# Patient Record
(demographics unavailable — no encounter records)

---

## 2024-11-19 NOTE — DATA REVIEWED
[FreeTextEntry1] : B/L Breast Sono - 10/31/2024: Ultrasound evaluation was performed including examination of all four quadrants of the breast(s) and the retroareolar region(s).   Left breast: In the area of palpable concern at 2:00 10 cm from nipple there is a multilobulated mass oriented parallel to the chest wall, with multiple cystic components, of heterogeneous echotexture, measuring 3 x 1.3 x 2.6 cm. Real-time imaging features are most suggestive of a cluster of multiple complicated cysts / focally prominent fibrocystic changes.   Right breast: No suspicious abnormalities were seen sonographically. A 1 cm cluster of cysts is seen in the upper inner quadrant. IMPRESSION:    Area of palpable concern in the left breast appears to correlate with focally prominent fibrocystic changes at 2:00 axis. Close sonographic follow-up is recommended in 3 months.   A 3 month follow-up targeted ultrasound of the left breast(s) is recommended. Clinical follow-up is also advised. Note that management of palpable findings should be based on clinical grounds, independent of imaging results.   A letter will be sent to the patient to return for follow up.   BI-RADS 3: PROBABLY BENIGN

## 2024-11-19 NOTE — REASON FOR VISIT
[Initial Evaluation] : an initial evaluation [FreeTextEntry1] : area of palpable concern; BIRADS-3 imaging review.

## 2024-11-19 NOTE — ASSESSMENT
[FreeTextEntry1] : Patient is a 29F with left breats mass, BIRADs.  She underwent bilateral US in 10/2024 for a left palpable mass which showed a right breast cluster of cysts, along with lwft 2N10 3cm mass, probably benign (BIRADS 3) with imaging recommended in 3 months.  We discussed breast cysts. They are not pre-malignant nor do they have malignant potential and are hormonally influenced. They may grow or shrink in size as well as resolve spontaneously and there is usually no intervention unless they are symptomatic. In several large studies, patients with breast cysts and a positive family history had a higher relative risk of breast cancer (from 1.5 to 3).  I explained to the patient the BIRADS system of grading and that her findings fall into category 3 on the left. Category 3 carries a less than 5% risk of malignancy. She can choose to follow up imaging. She is also aware that she can also choose to biopsy the lesion if she wishes to. I briefly discussed the procedure will be performed by a breast imaging specialist, and will include numbing with local anesthesia, followed by a small biopsy marker placement afterwards, which is usually not bothersome, and is not recognized by radiofrequency devices.  She understands her options and wants to proceed with a biopsy.  All questions and concerns were answered in detail.  Patient is for left breast US guided bx. She is for follow up after biopsy to discuss pathology and fuether management.   Total time spent on encounter was greater than 45 minutes , which included face to face time with the patient, performing an exam, reviewing previous medical records, reviewing current imaging/ pathology, documenting in patient record and coordinating care/imaging. Greater than 50% of the encounter was spent on counseling and coordination of her breast issue.

## 2024-11-19 NOTE — PHYSICAL EXAM
[Normocephalic] : normocephalic [EOMI] : extra ocular movement intact [No Supraclavicular Adenopathy] : no supraclavicular adenopathy [No Cervical Adenopathy] : no cervical adenopathy [Examined in the supine and seated position] : examined in the supine and seated position [No dominant masses] : no dominant masses in right breast  [No Nipple Retraction] : no left nipple retraction [No Nipple Discharge] : no left nipple discharge [No Axillary Lymphadenopathy] : no left axillary lymphadenopathy [No Rashes] : no rashes [No Ulceration] : no ulceration [de-identified] : NL respirations [de-identified] : UOQ approx 3cm mass, corresponding to finding on imaging

## 2024-11-19 NOTE — PHYSICAL EXAM
[Normocephalic] : normocephalic [EOMI] : extra ocular movement intact [No Supraclavicular Adenopathy] : no supraclavicular adenopathy [No Cervical Adenopathy] : no cervical adenopathy [Examined in the supine and seated position] : examined in the supine and seated position [No dominant masses] : no dominant masses in right breast  [No Nipple Retraction] : no left nipple retraction [No Nipple Discharge] : no left nipple discharge [No Axillary Lymphadenopathy] : no left axillary lymphadenopathy [No Rashes] : no rashes [No Ulceration] : no ulceration [de-identified] : NL respirations [de-identified] : UOQ approx 3cm mass, corresponding to finding on imaging

## 2024-11-19 NOTE — HISTORY OF PRESENT ILLNESS
[FreeTextEntry1] : SEAN PIKE is a 29-year-old female patient who presents today for initial consultation for evaluation of an area of palpable concern; BIRADS-3 imaging review.  Pt noted a palpable area in her left breast approximately one month ago - she states that it is unchanged. She also states that there is a soreness present as well.  No family hx - breast / ovarian cancer.  Her imaging is as follows: B/L Breast Sono - 10/31/2024: Left breast:  -In the area of palpable concern at 2:00 10 cm from nipple there is a multilobulated mass oriented parallel to the chest wall, with multiple cystic components, of heterogeneous echotexture, measuring 3 x 1.3 x 2.6 cm. Real-time imaging features are most suggestive of a cluster of multiple complicated cysts / focally prominent fibrocystic changes. Right breast:  -No suspicious abnormalities were seen sonographically.  -A 1 cm cluster of cysts is seen in the upper inner quadrant. IMPRESSION:  Area of palpable concern in the left breast appears to correlate with focally prominent fibrocystic changes at 2:00 axis. Close sonographic follow-up is recommended in 3 months. BI-RADS 3: PROBABLY BENIGN  She notes a left breats mass since 10/2024. She notes it may have gotten bigger. No associated symptoms.

## 2024-11-19 NOTE — PHYSICAL EXAM
[Normocephalic] : normocephalic [EOMI] : extra ocular movement intact [No Supraclavicular Adenopathy] : no supraclavicular adenopathy [No Cervical Adenopathy] : no cervical adenopathy [Examined in the supine and seated position] : examined in the supine and seated position [No dominant masses] : no dominant masses in right breast  [No Nipple Retraction] : no left nipple retraction [No Nipple Discharge] : no left nipple discharge [No Axillary Lymphadenopathy] : no left axillary lymphadenopathy [No Rashes] : no rashes [No Ulceration] : no ulceration [de-identified] : NL respirations [de-identified] : UOQ approx 3cm mass, corresponding to finding on imaging

## 2024-12-05 NOTE — DATA REVIEWED
[FreeTextEntry1] : Nov 25, 2024	10:18 AM	[phone]	 Addendum Pathology Report Received From [facility]:   The pathology report of the left breast ultrasound-guided core biopsy dated 11/25/2024 indicated that the target at 2:00, 10 cm from the nipple is MALIGNANT    The pathology report of the left axilla ultrasound-guided core biopsy dated 11/25/2024 indicated that the enlarged left axillary lymph node is MALIGNANT     Case Report Breast Biopsy Report                              Case: [id number]                                 Authorizing Provider:  Luz Blanco MD     Collected:           11/25/2024 0919             Ordering Location:     [facility]                Received:            11/25/2024 0959                                    Radiology-Regional                                                                                Radiology                                                                   Pathologist:           Tomasa Wharton MD                                                           Specimens:   A) - Breast, left, left breast 2n10                                                                B) - Axilla, left, left axilla                                                         Final Diagnosis  A. 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 See comments. - Lymphovascular invasion is present (LVI).    Comments: Sections show invasive carcinoma with basaloid features and biphasic morphology.associated with lymphocytic reaction and necrosis  Immunohistochemistry stains show that tumor is positive for Tammie-3, CK7, scattered tumor cells are positive for p63, SOX-10 and cytokeratin CAM5.2 (rare positive cells). Tumor is negative for BRST-2, AR, CK20, PAX-8, CDX-2, TTF-1 and Calpoinin.    Immmunostains for D2-40 (positive) and p63 (negative) confirm lymphovascualr invasion (LVI).   Biomarkers report will follow in an addendum.      B. Axilla, left, ultrasound (US) guided core biopsy:  - Poorly differentiated carcinoma with necrosis in a background of lymphoid tissue, consistent with metastatic carcinoma involving a lymph node. See note. - Fragment of benign skeletal muscle.    Note: Metastatic carcinoma is morphologically similar to carcinoma in specimen "A".      Case was reviewed at the intradepartmental breast pathology consensus conference.        The pathology results are concordant with the imaging findings.   The patient has been notified of these results by telephone by Dr. Blanco on 12/2/2024 at 8:15 PM. She has been advised to contact your office and to arrange for SURGICAL AND ONCOLOGIC CONSULTATION AND MANAGEMENT.   The patient would benefit from a right unilateral mammogram and pre and postcontrast bilateral breast MRI   Surgical consultation of the left breast(s) is recommended. _   Electronic Signature: I personally reviewed the images and agree with this report. Final Report: Dictated by  and Signed by Attending Luz Blanco MD 12/2/2024 8:17 PM   Addended by: Luz Blanco MD on 12/2/2024  8:17 PM Pathology Report Received From [facility]:   The pathology report of the left breast ultrasound-guided core biopsy dated 11/25/2024 indicated that the target at 2:00, 10 cm from the nipple is MALIGNANT    The pathology report of the left axilla ultrasound-guided core biopsy dated 11/25/2024 indicated that the enlarged left axillary lymph node is MALIGNANT     Case Report Breast Biopsy Report                              Case: [id number]                                 Authorizing Provider:  Luz Blanco MD     Collected:           11/25/2024 0919             Ordering Location:     [facility]                Received:            11/25/2024 1621                                    Radiology-Regional                                                                                Radiology                                                                   Pathologist:           Tomasa Wharton MD                                                           Specimens:   A) - Breast, left, left breast 2n10                                                                B) - Axilla, left, left axilla                                                         Final Diagnosis  A. 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 See comments. - Lymphovascular invasion is present (LVI).    Comments: Sections show invasive carcinoma with basaloid features and biphasic morphology.associated with lymphocytic reaction and necrosis  Immunohistochemistry stains show that tumor is positive for Tammie-3, CK7, scattered tumor cells are positive for p63, SOX-10 and cytokeratin CAM5.2 (rare positive cells). Tumor is negative for BRST-2, AR, CK20, PAX-8, CDX-2, TTF-1 and Calpoinin.    Immmunostains for D2-40 (positive) and p63 (negative) confirm lymphovascualr invasion (LVI).   Biomarkers report will follow in an addendum.      B. Axilla, left, ultrasound (US) guided core biopsy:  - Poorly differentiated carcinoma with necrosis in a background of lymphoid tissue, consistent with metastatic carcinoma involving a lymph node. See note. - Fragment of benign skeletal muscle.    Note: Metastatic carcinoma is morphologically similar to carcinoma in specimen "A".      Case was reviewed at the intradepartmental breast pathology consensus conference.        The pathology results are concordant with the imaging findings.   The patient has been notified of these results by telephone by Dr. Blanco on 12/2/2024 at 8:15 PM. She has been advised to contact your office and to arrange for SURGICAL AND ONCOLOGIC CONSULTATION AND MANAGEMENT.   The patient would benefit from a right unilateral mammogram and pre and postcontrast bilateral breast MRI   Surgical consultation of the left breast(s) is recommended. _   Electronic Signature: I personally reviewed the images and agree with this report. Final Report: Dictated by  and Signed by Attending Luz Blanco MD 12/2/2024 8:17 PM   Addended by: Luz Blanco MD on 12/2/2024  8:17 PM Pathology Report Received From [facility]:   The pathology report of the left breast ultrasound-guided core biopsy dated 11/25/2024 indicated that the target at 2:00, 10 cm from the nipple is MALIGNANT    The pathology report of the left axilla ultrasound-guided core biopsy dated 11/25/2024 indicated that the enlarged left axillary lymph node is MALIGNANT     Case Report Breast Biopsy Report                              Case: [id number]                                 Authorizing Provider:  Luz Blanco MD     Collected:           11/25/2024 0919             Ordering Location:     [facility]                Received:            11/25/2024 1623                                    Radiology-Regional                                                                                Radiology                                                                   Pathologist:           Tomasa Wharton MD                                                           Specimens:   A) - Breast, left, left breast 2n10                                                                B) - Axilla, left, left axilla                                                         Final Diagnosis  A. 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 See comments. - Lymphovascular invasion is present (LVI).    Comments: Sections show invasive carcinoma with basaloid features and biphasic morphology.associated with lymphocytic reaction and necrosis  Immunohistochemistry stains show that tumor is positive for Tammie-3, CK7, scattered tumor cells are positive for p63, SOX-10 and cytokeratin CAM5.2 (rare positive cells). Tumor is negative for BRST-2, AR, CK20, PAX-8, CDX-2, TTF-1 and Calpoinin.    Immmunostains for D2-40 (positive) and p63 (negative) confirm lymphovascualr invasion (LVI).   Biomarkers report will follow in an addendum.      B. Axilla, left, ultrasound (US) guided core biopsy:  - Poorly differentiated carcinoma with necrosis in a background of lymphoid tissue, consistent with metastatic carcinoma involving a lymph node. See note. - Fragment of benign skeletal muscle.    Note: Metastatic carcinoma is morphologically similar to carcinoma in specimen "A".      Case was reviewed at the intradepartmental breast pathology consensus conference.        The pathology results are concordant with the imaging findings.   The patient has been notified of these results by telephone by Dr. Blanco on 12/2/2024 at 8:15 PM. She has been advised to contact your office and to arrange for SURGICAL AND ONCOLOGIC CONSULTATION AND MANAGEMENT.   The patient would benefit from a right unilateral mammogram and pre and postcontrast bilateral breast MRI   Surgical consultation of the left breast(s) is recommended. _   Electronic Signature: I personally reviewed the images and agree with this report. Final Report: Dictated by  and Signed by Attending Luz Blanco MD 12/2/2024 8:17 PM   Addended by: Luz Blanco MD on 12/2/2024  8:17 PM     Full Report IMPRESSION:    Ultrasound guided core needle biopsy of the left breast and left axilla. Pathology is pending.   A final report will be issued when Pathology results are available. _     US BREAST CORE BIOPSY LEFT, MAMMO TOMOSYNTHESIS DIGITAL POST PROCEDURE LEFT, US BREAST CORE BIOPSY   HISTORY: Ultrasound of the left breast dated 10/31/2024 revealed a 3 x 2.6 cm complex lobular mass at 2 o'clock, 10 cm from the nipple. Ultrasound-guided core biopsy was performed.   Ultrasound of the left axilla on 11/25/2024, revealed a 3.3 x 2 cm mass or abnormal lymph node. Ultrasound-guided core biopsy was performed   Benefits, risks and alternatives to the procedure were explained to the patient and informed written consent was obtained.    The patient denied allergies to topical antiseptic solutions or local anesthetic. Anticoagulant usage was reviewed prior to this procedure as per [facility] protocol.   Utilizing universal protocol, site and patient verification was performed prior to starting the procedure.   PROCEDURE: After sterile skin preparation, local anesthesia was achieved with 1% lidocaine. Under ultrasound guidance, a 12G vacuum assisted needle biopsy device was used to obtain multiple core specimens.    After the procedure:   An hourglass and top hat marking clips was deployed in the area of sampling of the 10:00 left breast.    A cork clip was deployed into the left axilla   The patient tolerated the procedure well without immediate complications.    POST PROCEDURE MAMMOGRAM FOR MARKER PLACEMENT   A post-procedure mammogram was performed and demonstrates a clips in the appropriate location.    Right Uni Dx Mammo - 12/03/2024: Tomosynthesis 3D and 2D imaging of the breast(s) were performed.  Current study was also evaluated with a computer aided detection (CAD) system.   Breast composition: The breast(s) is/are heterogeneously dense, which may obscure small masses.   No significant masses, calcifications, or other findings are seen in the right breast. IMPRESSION:    No imaging evidence of malignancy on the current exam(s) in the right breast.    Given the patient's age, breast density and recent diagnosis of metastatic LEFT breast cancer, pre and post contrast bilateral breast MRI is recommended and the patient was so advised   An MRI exam of both breast(s) is recommended.   _   BI-RADS 6: KNOWN BIOPSY - PROVEN MALIGNANCY (left breast)   B/L Breast MRI - 12/04/2024: Amount of fibroglandular tissue: Extreme fibroglandular tissue.   Background parenchymal enhancement: Moderate symmetric.   Findings: There are small cysts in the upper inner quadrant right breast   There is mild edema of the left breast   RIGHT: No suspicious enhancing masses or areas of ductal enhancement are seen in the right breast.     LEFT: In the 2:00 left breast at approximately 10 cm from the nipple there is a robustly enhancing lobular mass measuring 3.8 x 2.9 x 4.9 cm. In AP, transverse and craniocaudal dimensions respectively (series 8 images #54 through #95). This represents the patient's biopsy-proven invasive ductal carcinoma.. The mass abuts the chest wall but there is no chest wall invasion or abnormal enhancement   There is a 0.6 cm enhancing nodule superficially in the 5:00 left breast, approximately 5 cm from the nipple  (series 8, image #136). Further assessment with MRI guided core biopsy can be obtained, unless left mastectomy is planned. IMPRESSION:    3.8 x 2.9 x 4.9 cm lobular mass in the 2:00 left breast at 10 cm from the nipple, biopsy-proven invasive ductal carcinoma. There is a nearby enlarged abnormal left axillary lymph node measuring 3.6 x 2.4 x 4 cm, biopsy-proven metastatic disease.   0.6 cm superficial enhancing nodule in the 5:00 left breast (series 8, image #136) for which MRI guided core biopsy can be performed, unless left mastectomy is planned.  The findings have been discussed with the patient by the dictating radiologist on 12/4/2024 at 3:00 PM   Biopsy of the left breast(s) is recommended.    BI-RADS 6: KNOWN BIOPSY - PROVEN MALIGNANCY   CT Scan Chest/Abd/Pel - 12/04/2024: FINDINGS:    LUNGS/AIRWAYS: Punctate nodule along the left fissure on series 6 image 202, possible small intrapulmonary lymph node. No suspicious intrapulmonary nodules   PLEURA/PERICARDIUM: No effusion.   MEDIASTINUM/ARLETTE/LYMPH NODES: Left central axillary enlarged node with marker, probably metastasis, series 4 image 57, 3.8 x 2.6 cm. No other suspicious adenopathy.   LIVER/BILIARY: Subcapsular 0.8 enhancing focus in hepatic segment 5/6, probably perfusional or benign lesion such as hemangioma.   PANCREAS: Normal.   SPLEEN: Normal.   ADRENALS: Normal.   KIDNEYS:  Symmetric enhancement. No hydronephrosis, renal stone, or soft tissue attenuation mass.   ABDOMINOPELVIC NODES: No enlarged nodes.   BOWEL/PERITONEUM: No bowel obstruction. No ascites.   PELVIC VISCERA: Slightly prominent right adnexa with probable corpus luteum.   BONES/SOFT TISSUES:  Left breast cancer with marked 4.1 x 2.8 cm, better evaluated on same-day MRI. No suspicious bone lesion.   OTHER: None. IMPRESSION:  1. Biopsy-proven right breast cancer, better evaluated by same day MRI, and left axillary lymph node metastasis. 2. No evidence of distant metastatic disease. 3. A right hepatic subcentimeter enhancing focus probably perfusion related or benign lesion; attention on follow-up.

## 2024-12-05 NOTE — HISTORY OF PRESENT ILLNESS
[FreeTextEntry1] : SEAN PIKE is a 29-year-old female with new dx of LEFT breast IDC   Pt noted a palpable area in her left breast approximately one month ago - she states that it is unchanged. She also states that there is a soreness present as well.  No family hx - breast / ovarian cancer.  Her imaging is as follows: B/L Breast Sono - 10/31/2024: Left breast:  -In the area of palpable concern at 2:00 10 cm from nipple there is a multilobulated mass oriented parallel to the chest wall, with multiple cystic components, of heterogeneous echotexture, measuring 3 x 1.3 x 2.6 cm. Real-time imaging features are most suggestive of a cluster of multiple complicated cysts / focally prominent fibrocystic changes. Right breast:  -No suspicious abnormalities were seen sonographically.  -A 1 cm cluster of cysts is seen in the upper inner quadrant. IMPRESSION:  Area of palpable concern in the left breast appears to correlate with focally prominent fibrocystic changes at 2:00 axis. Close sonographic follow-up is recommended in 3 months. BI-RADS 3  She notes a left breast mass since 10/2024. She notes it may have gotten bigger. No associated symptoms.  11/25/2024  A. 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 See comments. - Lymphovascular invasion is present (LVI).   B. Axilla, left, ultrasound (US) guided core biopsy: cork - Poorly differentiated carcinoma with necrosis in a background of lymphoid tissue, consistent with metastatic carcinoma involving a lymph node. See note. - Fragment of benign skeletal muscle.   The pathology results are concordant with the imaging findings.  Right Uni Dx Mammo - 12/03/2024: -Breast composition: The breast(s) is/are heterogeneously dense, which may obscure small masses. -No significant masses, calcifications, or other findings are seen in the right breast. IMPRESSION: No imaging evidence of malignancy on the current exam(s) in the right breast.  An MRI exam of both breast(s) is recommended. BI-RADS 6: KNOWN BIOPSY - PROVEN MALIGNANCY (left breast)   B/L Breast MRI - 12/04/2024: -Amount of fibroglandular tissue: Extreme fibroglandular tissue. Findings:  -There are small cysts in the upper inner quadrant right breast -There is mild edema of the left breast RIGHT: -No suspicious enhancing masses or areas of ductal enhancement are seen in the right breast. LEFT: -In the 2:00 left breast at approximately 10 cm from the nipple there is a robustly enhancing lobular mass measuring 3.8 x 2.9 x 4.9 cm.  -In AP, transverse and craniocaudal dimensions respectively (series 8 images #54 through #95). This represents the patient's biopsy-proven invasive ductal carcinoma.. The mass abuts the chest wall but there is no chest wall invasion or abnormal enhancement -There is a 0.6 cm enhancing nodule superficially in the 5:00 left breast, approximately 5 cm from the nipple  (series 8, image #136). Further assessment with MRI guided core biopsy can be obtained, unless left mastectomy is planned. IMPRESSION:  -3.8 x 2.9 x 4.9 cm lobular mass in the 2:00 left breast at 10 cm from the nipple, biopsy-proven invasive ductal carcinoma.  -There is a nearby enlarged abnormal left axillary lymph node measuring 3.6 x 2.4 x 4 cm, biopsy-proven metastatic disease. -0.6 cm superficial enhancing nodule in the 5:00 left breast (series 8, image #136) for which MRI guided core biopsy can be performed, unless left mastectomy is planned.  BI-RADS 6: KNOWN BIOPSY - PROVEN MALIGNANCY   CT Scan Chest/Abd/Pel - 12/04/2024: IMPRESSION:  1. Biopsy-proven right breast cancer, better evaluated by same day MRI, and left axillary lymph node metastasis. 2. No evidence of distant metastatic disease. 3. A right hepatic subcentimeter enhancing focus probably perfusion related or benign lesion; attention on follow-up.

## 2024-12-05 NOTE — DATA REVIEWED
[FreeTextEntry1] : Nov 25, 2024	10:18 AM	[phone]	 Addendum Pathology Report Received From [facility]:   The pathology report of the left breast ultrasound-guided core biopsy dated 11/25/2024 indicated that the target at 2:00, 10 cm from the nipple is MALIGNANT    The pathology report of the left axilla ultrasound-guided core biopsy dated 11/25/2024 indicated that the enlarged left axillary lymph node is MALIGNANT     Case Report Breast Biopsy Report                              Case: [id number]                                 Authorizing Provider:  Luz Blanco MD     Collected:           11/25/2024 0919             Ordering Location:     [facility]                Received:            11/25/2024 8894                                    Radiology-Regional                                                                                Radiology                                                                   Pathologist:           Tomasa Wharton MD                                                           Specimens:   A) - Breast, left, left breast 2n10                                                                B) - Axilla, left, left axilla                                                         Final Diagnosis  A. 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 See comments. - Lymphovascular invasion is present (LVI).    Comments: Sections show invasive carcinoma with basaloid features and biphasic morphology.associated with lymphocytic reaction and necrosis  Immunohistochemistry stains show that tumor is positive for Tammie-3, CK7, scattered tumor cells are positive for p63, SOX-10 and cytokeratin CAM5.2 (rare positive cells). Tumor is negative for BRST-2, AR, CK20, PAX-8, CDX-2, TTF-1 and Calpoinin.    Immmunostains for D2-40 (positive) and p63 (negative) confirm lymphovascualr invasion (LVI).   Biomarkers report will follow in an addendum.      B. Axilla, left, ultrasound (US) guided core biopsy:  - Poorly differentiated carcinoma with necrosis in a background of lymphoid tissue, consistent with metastatic carcinoma involving a lymph node. See note. - Fragment of benign skeletal muscle.    Note: Metastatic carcinoma is morphologically similar to carcinoma in specimen "A".      Case was reviewed at the intradepartmental breast pathology consensus conference.        The pathology results are concordant with the imaging findings.   The patient has been notified of these results by telephone by Dr. Blanco on 12/2/2024 at 8:15 PM. She has been advised to contact your office and to arrange for SURGICAL AND ONCOLOGIC CONSULTATION AND MANAGEMENT.   The patient would benefit from a right unilateral mammogram and pre and postcontrast bilateral breast MRI   Surgical consultation of the left breast(s) is recommended. _   Electronic Signature: I personally reviewed the images and agree with this report. Final Report: Dictated by  and Signed by Attending Luz Blanco MD 12/2/2024 8:17 PM   Addended by: Luz Blanco MD on 12/2/2024  8:17 PM Pathology Report Received From [facility]:   The pathology report of the left breast ultrasound-guided core biopsy dated 11/25/2024 indicated that the target at 2:00, 10 cm from the nipple is MALIGNANT    The pathology report of the left axilla ultrasound-guided core biopsy dated 11/25/2024 indicated that the enlarged left axillary lymph node is MALIGNANT     Case Report Breast Biopsy Report                              Case: [id number]                                 Authorizing Provider:  Luz Blanco MD     Collected:           11/25/2024 0919             Ordering Location:     [facility]                Received:            11/25/2024 1628                                    Radiology-Regional                                                                                Radiology                                                                   Pathologist:           Tomasa Wharton MD                                                           Specimens:   A) - Breast, left, left breast 2n10                                                                B) - Axilla, left, left axilla                                                         Final Diagnosis  A. 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 See comments. - Lymphovascular invasion is present (LVI).    Comments: Sections show invasive carcinoma with basaloid features and biphasic morphology.associated with lymphocytic reaction and necrosis  Immunohistochemistry stains show that tumor is positive for Tammie-3, CK7, scattered tumor cells are positive for p63, SOX-10 and cytokeratin CAM5.2 (rare positive cells). Tumor is negative for BRST-2, AR, CK20, PAX-8, CDX-2, TTF-1 and Calpoinin.    Immmunostains for D2-40 (positive) and p63 (negative) confirm lymphovascualr invasion (LVI).   Biomarkers report will follow in an addendum.      B. Axilla, left, ultrasound (US) guided core biopsy:  - Poorly differentiated carcinoma with necrosis in a background of lymphoid tissue, consistent with metastatic carcinoma involving a lymph node. See note. - Fragment of benign skeletal muscle.    Note: Metastatic carcinoma is morphologically similar to carcinoma in specimen "A".      Case was reviewed at the intradepartmental breast pathology consensus conference.        The pathology results are concordant with the imaging findings.   The patient has been notified of these results by telephone by Dr. Blanco on 12/2/2024 at 8:15 PM. She has been advised to contact your office and to arrange for SURGICAL AND ONCOLOGIC CONSULTATION AND MANAGEMENT.   The patient would benefit from a right unilateral mammogram and pre and postcontrast bilateral breast MRI   Surgical consultation of the left breast(s) is recommended. _   Electronic Signature: I personally reviewed the images and agree with this report. Final Report: Dictated by  and Signed by Attending Luz Blanco MD 12/2/2024 8:17 PM   Addended by: Luz Blanco MD on 12/2/2024  8:17 PM Pathology Report Received From [facility]:   The pathology report of the left breast ultrasound-guided core biopsy dated 11/25/2024 indicated that the target at 2:00, 10 cm from the nipple is MALIGNANT    The pathology report of the left axilla ultrasound-guided core biopsy dated 11/25/2024 indicated that the enlarged left axillary lymph node is MALIGNANT     Case Report Breast Biopsy Report                              Case: [id number]                                 Authorizing Provider:  Luz Blanco MD     Collected:           11/25/2024 0919             Ordering Location:     [facility]                Received:            11/25/2024 1623                                    Radiology-Regional                                                                                Radiology                                                                   Pathologist:           Tomasa Wharton MD                                                           Specimens:   A) - Breast, left, left breast 2n10                                                                B) - Axilla, left, left axilla                                                         Final Diagnosis  A. 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 See comments. - Lymphovascular invasion is present (LVI).    Comments: Sections show invasive carcinoma with basaloid features and biphasic morphology.associated with lymphocytic reaction and necrosis  Immunohistochemistry stains show that tumor is positive for Tammie-3, CK7, scattered tumor cells are positive for p63, SOX-10 and cytokeratin CAM5.2 (rare positive cells). Tumor is negative for BRST-2, AR, CK20, PAX-8, CDX-2, TTF-1 and Calpoinin.    Immmunostains for D2-40 (positive) and p63 (negative) confirm lymphovascualr invasion (LVI).   Biomarkers report will follow in an addendum.      B. Axilla, left, ultrasound (US) guided core biopsy:  - Poorly differentiated carcinoma with necrosis in a background of lymphoid tissue, consistent with metastatic carcinoma involving a lymph node. See note. - Fragment of benign skeletal muscle.    Note: Metastatic carcinoma is morphologically similar to carcinoma in specimen "A".      Case was reviewed at the intradepartmental breast pathology consensus conference.        The pathology results are concordant with the imaging findings.   The patient has been notified of these results by telephone by Dr. Blanco on 12/2/2024 at 8:15 PM. She has been advised to contact your office and to arrange for SURGICAL AND ONCOLOGIC CONSULTATION AND MANAGEMENT.   The patient would benefit from a right unilateral mammogram and pre and postcontrast bilateral breast MRI   Surgical consultation of the left breast(s) is recommended. _   Electronic Signature: I personally reviewed the images and agree with this report. Final Report: Dictated by  and Signed by Attending Luz Blanco MD 12/2/2024 8:17 PM   Addended by: Luz Blanco MD on 12/2/2024  8:17 PM     Full Report IMPRESSION:    Ultrasound guided core needle biopsy of the left breast and left axilla. Pathology is pending.   A final report will be issued when Pathology results are available. _     US BREAST CORE BIOPSY LEFT, MAMMO TOMOSYNTHESIS DIGITAL POST PROCEDURE LEFT, US BREAST CORE BIOPSY   HISTORY: Ultrasound of the left breast dated 10/31/2024 revealed a 3 x 2.6 cm complex lobular mass at 2 o'clock, 10 cm from the nipple. Ultrasound-guided core biopsy was performed.   Ultrasound of the left axilla on 11/25/2024, revealed a 3.3 x 2 cm mass or abnormal lymph node. Ultrasound-guided core biopsy was performed   Benefits, risks and alternatives to the procedure were explained to the patient and informed written consent was obtained.    The patient denied allergies to topical antiseptic solutions or local anesthetic. Anticoagulant usage was reviewed prior to this procedure as per [facility] protocol.   Utilizing universal protocol, site and patient verification was performed prior to starting the procedure.   PROCEDURE: After sterile skin preparation, local anesthesia was achieved with 1% lidocaine. Under ultrasound guidance, a 12G vacuum assisted needle biopsy device was used to obtain multiple core specimens.    After the procedure:   An hourglass and top hat marking clips was deployed in the area of sampling of the 10:00 left breast.    A cork clip was deployed into the left axilla   The patient tolerated the procedure well without immediate complications.    POST PROCEDURE MAMMOGRAM FOR MARKER PLACEMENT   A post-procedure mammogram was performed and demonstrates a clips in the appropriate location.    Right Uni Dx Mammo - 12/03/2024: Tomosynthesis 3D and 2D imaging of the breast(s) were performed.  Current study was also evaluated with a computer aided detection (CAD) system.   Breast composition: The breast(s) is/are heterogeneously dense, which may obscure small masses.   No significant masses, calcifications, or other findings are seen in the right breast. IMPRESSION:    No imaging evidence of malignancy on the current exam(s) in the right breast.    Given the patient's age, breast density and recent diagnosis of metastatic LEFT breast cancer, pre and post contrast bilateral breast MRI is recommended and the patient was so advised   An MRI exam of both breast(s) is recommended.   _   BI-RADS 6: KNOWN BIOPSY - PROVEN MALIGNANCY (left breast)   B/L Breast MRI - 12/04/2024: Amount of fibroglandular tissue: Extreme fibroglandular tissue.   Background parenchymal enhancement: Moderate symmetric.   Findings: There are small cysts in the upper inner quadrant right breast   There is mild edema of the left breast   RIGHT: No suspicious enhancing masses or areas of ductal enhancement are seen in the right breast.     LEFT: In the 2:00 left breast at approximately 10 cm from the nipple there is a robustly enhancing lobular mass measuring 3.8 x 2.9 x 4.9 cm. In AP, transverse and craniocaudal dimensions respectively (series 8 images #54 through #95). This represents the patient's biopsy-proven invasive ductal carcinoma.. The mass abuts the chest wall but there is no chest wall invasion or abnormal enhancement   There is a 0.6 cm enhancing nodule superficially in the 5:00 left breast, approximately 5 cm from the nipple  (series 8, image #136). Further assessment with MRI guided core biopsy can be obtained, unless left mastectomy is planned. IMPRESSION:    3.8 x 2.9 x 4.9 cm lobular mass in the 2:00 left breast at 10 cm from the nipple, biopsy-proven invasive ductal carcinoma. There is a nearby enlarged abnormal left axillary lymph node measuring 3.6 x 2.4 x 4 cm, biopsy-proven metastatic disease.   0.6 cm superficial enhancing nodule in the 5:00 left breast (series 8, image #136) for which MRI guided core biopsy can be performed, unless left mastectomy is planned.  The findings have been discussed with the patient by the dictating radiologist on 12/4/2024 at 3:00 PM   Biopsy of the left breast(s) is recommended.    BI-RADS 6: KNOWN BIOPSY - PROVEN MALIGNANCY   CT Scan Chest/Abd/Pel - 12/04/2024: FINDINGS:    LUNGS/AIRWAYS: Punctate nodule along the left fissure on series 6 image 202, possible small intrapulmonary lymph node. No suspicious intrapulmonary nodules   PLEURA/PERICARDIUM: No effusion.   MEDIASTINUM/ARLETTE/LYMPH NODES: Left central axillary enlarged node with marker, probably metastasis, series 4 image 57, 3.8 x 2.6 cm. No other suspicious adenopathy.   LIVER/BILIARY: Subcapsular 0.8 enhancing focus in hepatic segment 5/6, probably perfusional or benign lesion such as hemangioma.   PANCREAS: Normal.   SPLEEN: Normal.   ADRENALS: Normal.   KIDNEYS:  Symmetric enhancement. No hydronephrosis, renal stone, or soft tissue attenuation mass.   ABDOMINOPELVIC NODES: No enlarged nodes.   BOWEL/PERITONEUM: No bowel obstruction. No ascites.   PELVIC VISCERA: Slightly prominent right adnexa with probable corpus luteum.   BONES/SOFT TISSUES:  Left breast cancer with marked 4.1 x 2.8 cm, better evaluated on same-day MRI. No suspicious bone lesion.   OTHER: None. IMPRESSION:  1. Biopsy-proven right breast cancer, better evaluated by same day MRI, and left axillary lymph node metastasis. 2. No evidence of distant metastatic disease. 3. A right hepatic subcentimeter enhancing focus probably perfusion related or benign lesion; attention on follow-up.